# Patient Record
Sex: MALE | URBAN - METROPOLITAN AREA
[De-identification: names, ages, dates, MRNs, and addresses within clinical notes are randomized per-mention and may not be internally consistent; named-entity substitution may affect disease eponyms.]

---

## 2019-08-20 ENCOUNTER — APPOINTMENT (RX ONLY)
Dept: URBAN - METROPOLITAN AREA CLINIC 36 | Facility: CLINIC | Age: 34
Setting detail: DERMATOLOGY
End: 2019-08-20

## 2019-08-20 DIAGNOSIS — A63.0 ANOGENITAL (VENEREAL) WARTS: ICD-10-CM

## 2019-08-20 PROCEDURE — 17110 DESTRUCTION B9 LES UP TO 14: CPT

## 2019-09-05 ENCOUNTER — APPOINTMENT (RX ONLY)
Dept: URBAN - METROPOLITAN AREA CLINIC 38 | Facility: CLINIC | Age: 34
Setting detail: DERMATOLOGY
End: 2019-09-05

## 2019-09-05 DIAGNOSIS — A63.0 ANOGENITAL (VENEREAL) WARTS: ICD-10-CM

## 2019-09-05 PROCEDURE — 17110 DESTRUCTION B9 LES UP TO 14: CPT

## 2021-01-11 ENCOUNTER — APPOINTMENT (RX ONLY)
Dept: URBAN - METROPOLITAN AREA CLINIC 38 | Facility: CLINIC | Age: 36
Setting detail: DERMATOLOGY
End: 2021-01-11

## 2021-01-11 DIAGNOSIS — A63.0 ANOGENITAL (VENEREAL) WARTS: ICD-10-CM

## 2021-01-11 PROCEDURE — 54056 CRYOSURGERY PENIS LESION(S): CPT

## 2021-01-11 PROCEDURE — ? OTHER

## 2021-01-11 PROCEDURE — ? BENIGN DESTRUCTION (GENITALS)

## 2021-01-11 ASSESSMENT — LOCATION SIMPLE DESCRIPTION DERM
LOCATION SIMPLE: PENIS
LOCATION SIMPLE: GROIN
LOCATION SIMPLE: SCROTUM

## 2021-01-11 ASSESSMENT — LOCATION ZONE DERM
LOCATION ZONE: PENIS
LOCATION ZONE: GENITALIA
LOCATION ZONE: TRUNK

## 2021-01-11 ASSESSMENT — LOCATION DETAILED DESCRIPTION DERM
LOCATION DETAILED: LEFT DORSAL SHAFT OF PENIS
LOCATION DETAILED: SUPRAPUBIC SKIN
LOCATION DETAILED: RIGHT ANTERIOR SCROTUM

## 2021-01-11 NOTE — PROCEDURE: BENIGN DESTRUCTION (GENITALS)
Method: liquid nitrogen
Detail Level: Detailed
Warning: This plan will only bill for destructions on the Penis and Vulva. If you select the Scrotum it will not bill.
Render Post-Care Instructions In Note?: no
Post-Care Instructions: I reviewed with the patient in detail post-care instructions. Patient is to wear sunprotection, and avoid picking at any of the treated lesions. Pt may apply Vaseline to crusted or scabbing areas.
Consent: The patient's consent was obtained including but not limited to risks of crusting, scabbing, blistering, scarring, darker or lighter pigmentary change, recurrence, incomplete removal and infection.
Anesthesia Volume In Cc: 0.5

## 2021-01-11 NOTE — PROCEDURE: OTHER
Detail Level: Detailed
Note Text (......Xxx Chief Complaint.): This diagnosis correlates with the
Render Risk Assessment In Note?: no
Other (Free Text): Patient had questions in regards to his sexual partners and getting vaccinated. I advised that he have his partner consult with her gynecologist in regards to the vaccine. He should also consult with his PCP.

## 2021-02-02 ENCOUNTER — APPOINTMENT (RX ONLY)
Dept: URBAN - METROPOLITAN AREA CLINIC 38 | Facility: CLINIC | Age: 36
Setting detail: DERMATOLOGY
End: 2021-02-02

## 2021-02-02 DIAGNOSIS — A63.0 ANOGENITAL (VENEREAL) WARTS: ICD-10-CM

## 2021-02-02 PROCEDURE — 54056 CRYOSURGERY PENIS LESION(S): CPT

## 2021-02-02 PROCEDURE — ? ADDITIONAL NOTES

## 2021-02-02 PROCEDURE — ? COUNSELING

## 2021-02-02 PROCEDURE — ? LIQUID NITROGEN GENITALS

## 2021-02-02 ASSESSMENT — LOCATION SIMPLE DESCRIPTION DERM: LOCATION SIMPLE: PENIS

## 2021-02-02 ASSESSMENT — LOCATION ZONE DERM: LOCATION ZONE: PENIS

## 2021-02-02 ASSESSMENT — LOCATION DETAILED DESCRIPTION DERM: LOCATION DETAILED: RIGHT DORSAL SHAFT OF PENIS
